# Patient Record
Sex: MALE | Race: WHITE | NOT HISPANIC OR LATINO | Employment: FULL TIME | URBAN - METROPOLITAN AREA
[De-identification: names, ages, dates, MRNs, and addresses within clinical notes are randomized per-mention and may not be internally consistent; named-entity substitution may affect disease eponyms.]

---

## 2020-11-11 ENCOUNTER — OFFICE VISIT (OUTPATIENT)
Dept: INTERNAL MEDICINE | Facility: CLINIC | Age: 50
End: 2020-11-11
Payer: COMMERCIAL

## 2020-11-11 VITALS
HEART RATE: 65 BPM | DIASTOLIC BLOOD PRESSURE: 80 MMHG | WEIGHT: 166.1 LBS | BODY MASS INDEX: 25.17 KG/M2 | RESPIRATION RATE: 16 BRPM | SYSTOLIC BLOOD PRESSURE: 124 MMHG | TEMPERATURE: 98.8 F | HEIGHT: 68 IN | OXYGEN SATURATION: 97 %

## 2020-11-11 DIAGNOSIS — Z12.11 ENCOUNTER FOR SCREENING FOR MALIGNANT NEOPLASM OF COLON: ICD-10-CM

## 2020-11-11 DIAGNOSIS — Z00.00 ENCOUNTER FOR ROUTINE ADULT HEALTH EXAMINATION WITHOUT ABNORMAL FINDINGS: Primary | ICD-10-CM

## 2020-11-11 DIAGNOSIS — Z13.220 ENCOUNTER FOR SCREENING FOR LIPID DISORDER: ICD-10-CM

## 2020-11-11 DIAGNOSIS — Z13.1 ENCOUNTER FOR SCREENING FOR DIABETES MELLITUS: ICD-10-CM

## 2020-11-11 PROCEDURE — 99386 PREV VISIT NEW AGE 40-64: CPT | Performed by: INTERNAL MEDICINE

## 2020-11-11 ASSESSMENT — MIFFLIN-ST. JEOR: SCORE: 1579.98

## 2020-11-11 NOTE — PATIENT INSTRUCTIONS
- I will send you a message on [employer] when I am able to look at the results of your tests from today.    Patient Education   Colonoscopy  What You Should Know  Please arrive with an adult who can drive you home after the exam and stay with you for the next six hours. The medicines used in the exam will make you sleepy. You will not be able to drive. You cannot take a bus or taxi by yourself.  What is a colonoscopy?  A colonoscopy lets the doctor look inside your large intestine (colon). The doctor guides a scope, or small camera, through the entire colon. The scope is attached to a long, flexible tube. The image from the scope appears on a large TV screen.   The scope will send air into your colon. This opens the colon so the doctor can see it better on the screen.  The exam looks for defects such as inflamed tissue, polyps, ulcers (sores), diverticula (pouches in the wall of the colon) and signs of cancer.  How do I prepare for the exam?  Read your guidelines for cleansing the colon. It is highly important that you follow the directions closely. If your colon is empty and clean, your exam will be more thorough and take less time.   Be sure to tell your doctor or nurse if you have ever had eye, lung or heart disease (including endocarditis or an artificial valve); diabetes; hepatitis; or any allergies to medicines.  Plan to arrive on time for your exam.    Dress in comfortable, loose clothing.    Bring your insurance card. Leave your purse, billfold, credit cards and other valuables at home.    Bring a list of your medicines and known allergies. If you have a pacemaker or ICD, please bring your information card.    We do our best to stay on time, but there may be a delay. Please bring something to pass the time, such as a newspaper or book.  What happens when I arrive?    You will fill out some paperwork, then we will take you to a private area. A nurse will check your records and ask you questions.    You will  change into a hospital gown. We may ask you to remove any jewelry.    We will review the risks and benefits of the exam. You will need to sign a consent form.    We will insert an IV (thin tube) into a vein in your hand or arm. We will use this to give you medicine.  What happens during the exam?  The exam takes from 15 minutes to one hour. You may ask questions of the doctor.    You will lie on your left side on a table.    You will receive medicines to relieve pain and help you relax.    The doctor will insert the scope into your rectum (bottom). The scope is on a long, thin tube. The doctor will slowly guide the scope into your colon. The tube bends, so it can move through the curves of your colon.    We may ask you to change positions to help the doctor move the scope.  If we see any polyps (growths), we will remove them. We do this because polyps can cause bleeding or turn into cancer. For some polyps, we will take tissue (a biopsy) to test in the lab. Most polyps turn out to be benign (not cancer).   Will it hurt?  You should feel little or no discomfort. The air will make you will feel full, and you will notice some pressure as the tube passes through your colon. Tell your doctor or nurse if you feel any pain. If you have cramps, breathe slowly to help your body relax.   What happens after the exam?    We will take you to the recovery area. We will watch you for up to one hour or until most of the medicine has worn off.    We will remove the IV. You will receive a report about your exam.    You may feel bloated or crampy for a short time because of the air that was injected. You will need to be passing air before you go home.    Your first meal should be light. Slowly return to normal meals, unless your doctor gives you other orders. Take it easy the rest of the day.    If you had a polyp removed:  ? Avoid heavy exercise for one week (no heavy lifting, sports or other activity).  ? Avoid nuts and popcorn for  one week.  ? You may have bleeding for several days after your exam. Call your doctor if bleeding is heavy or you have black or bloody stools (bowel movements).  ? If you normally take aspirin or blood thinners, ask your doctor when you can start taking them again.    You may receive more than one bill for your exam. (Separate charges may come from the clinic, doctor, lab, etc.).  What if I need to change my appointment?  If you cannot keep your appointment, please call us as soon as you can. Our center is very busy, and we will need to contact the patient who comes after you.   For informational purposes only. Not to replace the advice of your health care provider. Copyright   2007 [employer]. All rights reserved. Developed in collaboration with [facility]. Clinically reviewed by Gastroenterology Steering Committee. [employer] [id number] - REV 06/19

## 2020-11-11 NOTE — PROGRESS NOTES
SUBJECTIVE:   CC: Edgar Lu is an 50 year old male who presents for preventative health visit.     {Patient has been advised of split billing requirements and indicates understanding: Yes     Healthy Habits:     Getting at least 3 servings of Calcium per day:  Yes    Bi-annual eye exam:  Yes    Dental care twice a year:  NO    Sleep apnea or symptoms of sleep apnea:  None    Diet:  Regular (no restrictions)    Frequency of exercise:  2-3 days/week    Duration of exercise:  15-30 minutes    Taking medications regularly:  Not Applicable    Medication side effects:  None    PHQ-2 Total Score: 0    Additional concerns today:  No    Edgar comes in today for a physical exam. He has no complaints today. He does not desire to discuss anything else.    Of note, we had a lengthy discussion today about the pluses and minuses of PSA testing and he ultimately declined to pursue it.    Today's PHQ-2 Score:   PHQ-2 ( 1999 Pfizer) 11/11/2020   Q1: Little interest or pleasure in doing things 0   Q2: Feeling down, depressed or hopeless 0   PHQ-2 Score 0   Q1: Little interest or pleasure in doing things Not at all   Q2: Feeling down, depressed or hopeless Not at all   PHQ-2 Score 0       Abuse: Current or Past(Physical, Sexual or Emotional)- No  Do you feel safe in your environment? Yes    Social History     Tobacco Use     Smoking status: Never Smoker     Smokeless tobacco: Never Used   Substance Use Topics     Alcohol use: No     If you drink alcohol do you typically have >3 drinks per day or >7 drinks per week? No    Alcohol Use 11/11/2020   Prescreen: >3 drinks/day or >7 drinks/week? Not Applicable   Prescreen: >3 drinks/day or >7 drinks/week? -       Last PSA: No results found for: PSA    Reviewed orders with patient. Reviewed health maintenance and updated orders accordingly - Yes  Labs reviewed in EPIC    Reviewed and updated as needed this visit by clinical staff  Tobacco  Allergies  Meds  Problems  Med Hx  Surg Hx  " Fam Hx  Soc Hx          Reviewed and updated as needed this visit by Provider  Tobacco  Allergies  Meds  Problems  Med Hx  Surg Hx  Fam Hx          Review of Systems  CONSTITUTIONAL: NEGATIVE for fever, chills, change in weight  INTEGUMENTARY/SKIN: NEGATIVE for worrisome rashes, moles or lesions  EYES: NEGATIVE for vision changes or irritation  ENT: NEGATIVE for ear, mouth and throat problems  RESP: NEGATIVE for significant cough or SOB  CV: NEGATIVE for chest pain, palpitations or peripheral edema  GI: NEGATIVE for nausea, abdominal pain, heartburn, or change in bowel habits   male: negative for dysuria, hematuria, decreased urinary stream, erectile dysfunction, urethral discharge  MUSCULOSKELETAL: NEGATIVE for significant arthralgias or myalgia  NEURO: NEGATIVE for weakness, dizziness or paresthesias  PSYCHIATRIC: NEGATIVE for changes in mood or affect    OBJECTIVE:   /80   Pulse 65   Temp 98.8  F (37.1  C) (Temporal)   Resp 16   Ht 1.715 m (5' 7.5\")   Wt 75.3 kg (166 lb 1.6 oz)   SpO2 97%   BMI 25.63 kg/m      Physical Exam  GENERAL alert and in no distress.  EYES conjunctivae/corneas clear. PERRL. EOMs grossly intact  HENT: NC/AT, facies symmetric  NECK: Neck supple. No LAD, without thyroidmegaly or JVD.  LYMPH: no cervical LAD appreciated  RESP: CTAB. No w/r/r.  CV: RRR, no m/r/g.  GI: NT, ND, no organomegaly, normal BS in all quadrants, without rebound or guarding  MSK: No cyanosis, clubbing or edema noted bilaterally in upper and/or lower extremities  SKIN: no significant ulcers, lesions or rashes on the visualized portions of the skin  NEURO: Alert. Oriented. Gait normal. Sensation grossly WNL.  PSYCH: Linear thought process. Speech normal rate and volume; able to articulate logical thoughts, able to abstract reason. No tangential thoughts, hallucinations, or delusions.    Diagnostic Test Results: Labs reviewed in [facility]    ASSESSMENT/PLAN:   1. Encounter for routine adult health " "examination without abnormal findings  We discussed cardiac risk factor modification, including screening for (and treating if present) high cholesterol, HTN, DM, and avoiding smoking.   Also discussed age appropriate cancer screening recommendations, including prostate, colon and lung cancer as dictated by age group.  Recommended a healthy, plant-focused diet as well as regular aerobic activity.  Recommended moderation in any alcohol use.     2. Encounter for screening for lipid disorder  Not fasting today. Will set up visit for fasting blood draw.  - Lipid Profile; Future    3. Encounter for screening for diabetes mellitus  Not fasting today. Will set up visit for fasting blood draw.  - Basic metabolic panel; Future    4. Encounter for screening for malignant neoplasm of colon  Discussed different CRCa screening techniques, and he was interested in a colonoscopy. Referral sent.  - GASTROENTEROLOGY ADULT REF PROCEDURE ONLY    Patient has been advised of split billing requirements and indicates understanding: Yes  COUNSELING:   Reviewed preventive health counseling, as reflected in patient instructions       Regular exercise       Healthy diet/nutrition       Colon cancer screening       Prostate cancer screening    Estimated body mass index is 25.63 kg/m  as calculated from the following:    Height as of this encounter: 1.715 m (5' 7.5\").    Weight as of this encounter: 75.3 kg (166 lb 1.6 oz).     Weight management plan: Discussed healthy diet and exercise guidelines    He reports that he has never smoked. He has never used smokeless tobacco.    Counseling Resources:  ATP IV Guidelines  Pooled Cohorts Equation Calculator  FRAX Risk Assessment  ICSI Preventive Guidelines  Dietary Guidelines for Americans, 2010  USDA's MyPlate  ASA Prophylaxis  Lung CA Screening    Dillon Phillips MD  [facility]  "

## 2020-11-21 DIAGNOSIS — Z13.1 ENCOUNTER FOR SCREENING FOR DIABETES MELLITUS: ICD-10-CM

## 2020-11-21 DIAGNOSIS — Z13.220 ENCOUNTER FOR SCREENING FOR LIPID DISORDER: ICD-10-CM

## 2020-11-21 LAB
ANION GAP SERPL CALCULATED.3IONS-SCNC: 4 MMOL/L (ref 3–14)
BUN SERPL-MCNC: 17 MG/DL (ref 7–30)
CALCIUM SERPL-MCNC: 9.2 MG/DL (ref 8.5–10.1)
CHLORIDE SERPL-SCNC: 110 MMOL/L (ref 94–109)
CHOLEST SERPL-MCNC: 193 MG/DL
CO2 SERPL-SCNC: 28 MMOL/L (ref 20–32)
CREAT SERPL-MCNC: 1.04 MG/DL (ref 0.66–1.25)
GFR SERPL CREATININE-BSD FRML MDRD: 83 ML/MIN/{1.73_M2}
GLUCOSE SERPL-MCNC: 91 MG/DL (ref 70–99)
HDLC SERPL-MCNC: 43 MG/DL
LDLC SERPL CALC-MCNC: 133 MG/DL
NONHDLC SERPL-MCNC: 150 MG/DL
POTASSIUM SERPL-SCNC: 3.9 MMOL/L (ref 3.4–5.3)
SODIUM SERPL-SCNC: 142 MMOL/L (ref 133–144)
TRIGL SERPL-MCNC: 85 MG/DL

## 2020-11-21 PROCEDURE — 80048 BASIC METABOLIC PNL TOTAL CA: CPT | Performed by: INTERNAL MEDICINE

## 2020-11-21 PROCEDURE — 80061 LIPID PANEL: CPT | Performed by: INTERNAL MEDICINE

## 2020-11-21 PROCEDURE — 36415 COLL VENOUS BLD VENIPUNCTURE: CPT | Performed by: INTERNAL MEDICINE

## 2020-11-23 ENCOUNTER — TELEPHONE (OUTPATIENT)
Dept: INTERNAL MEDICINE | Facility: CLINIC | Age: 50
End: 2020-11-23

## 2020-11-23 NOTE — TELEPHONE ENCOUNTER
I re-sent an email with his [place]alo code. Please ensure he is able to access T[place]alo or is able to access my note + his labs.

## 2020-11-23 NOTE — TELEPHONE ENCOUNTER
Reason for call:  Other   Patient called regarding (reason for call): Documentation of visit with physical and labs on it.   Additional comments: Patient tried setting up Mychart but it didn't work. They need a document indicating that they visited the doctor with physical and labs done. If you can contact him back on this. The aftervisit summary he got doesn't have the labs noted.     Phone number to reach patient:  Cell number on file:    Telephone Information:   Mobile [phone]       Best Time:  After 3pm     Can we leave a detailed message on this number?  YES    Travel screening: Not Applicable

## 2020-12-01 DIAGNOSIS — Z11.59 ENCOUNTER FOR SCREENING FOR OTHER VIRAL DISEASES: Primary | ICD-10-CM

## 2020-12-13 DIAGNOSIS — Z11.59 ENCOUNTER FOR SCREENING FOR OTHER VIRAL DISEASES: ICD-10-CM

## 2020-12-13 LAB
SARS-COV-2 RNA SPEC QL NAA+PROBE: NORMAL
SPECIMEN SOURCE: NORMAL

## 2020-12-13 PROCEDURE — U0003 INFECTIOUS AGENT DETECTION BY NUCLEIC ACID (DNA OR RNA); SEVERE ACUTE RESPIRATORY SYNDROME CORONAVIRUS 2 (SARS-COV-2) (CORONAVIRUS DISEASE [COVID-19]), AMPLIFIED PROBE TECHNIQUE, MAKING USE OF HIGH THROUGHPUT TECHNOLOGIES AS DESCRIBED BY CMS-2020-01-R: HCPCS | Performed by: INTERNAL MEDICINE

## 2020-12-14 LAB
LABORATORY COMMENT REPORT: NORMAL
SARS-COV-2 RNA SPEC QL NAA+PROBE: NEGATIVE
SPECIMEN SOURCE: NORMAL

## 2020-12-16 ENCOUNTER — HOSPITAL ENCOUNTER (OUTPATIENT)
Facility: CLINIC | Age: 50
Discharge: HOME | End: 2020-12-16
Attending: INTERNAL MEDICINE | Admitting: INTERNAL MEDICINE
Payer: COMMERCIAL

## 2020-12-16 VITALS
OXYGEN SATURATION: 97 % | DIASTOLIC BLOOD PRESSURE: 65 MMHG | BODY MASS INDEX: 25.16 KG/M2 | TEMPERATURE: 97.7 F | SYSTOLIC BLOOD PRESSURE: 104 MMHG | RESPIRATION RATE: 14 BRPM | WEIGHT: 166 LBS | HEIGHT: 68 IN | HEART RATE: 53 BPM

## 2020-12-16 LAB — COLONOSCOPY: NORMAL

## 2020-12-16 PROCEDURE — 258N000003 HC RX IP 258 OP 636: Performed by: INTERNAL MEDICINE

## 2020-12-16 PROCEDURE — G0500 MOD SEDAT ENDO SERVICE >5YRS: HCPCS | Performed by: INTERNAL MEDICINE

## 2020-12-16 PROCEDURE — 45378 DIAGNOSTIC COLONOSCOPY: CPT | Performed by: INTERNAL MEDICINE

## 2020-12-16 PROCEDURE — G0121 COLON CA SCRN NOT HI RSK IND: HCPCS | Performed by: INTERNAL MEDICINE

## 2020-12-16 PROCEDURE — 250N000011 HC RX IP 250 OP 636: Performed by: INTERNAL MEDICINE

## 2020-12-16 RX ORDER — FENTANYL CITRATE 50 UG/ML
INJECTION, SOLUTION INTRAMUSCULAR; INTRAVENOUS PRN
Status: DISCONTINUED | OUTPATIENT
Start: 2020-12-16 | End: 2020-12-16 | Stop reason: HOSPADM

## 2020-12-16 ASSESSMENT — MIFFLIN-ST. JEOR: SCORE: 1579.53

## 2021-10-03 ENCOUNTER — HEALTH MAINTENANCE LETTER (OUTPATIENT)
Age: 51
End: 2021-10-03

## 2021-11-21 ASSESSMENT — ENCOUNTER SYMPTOMS
CHILLS: 0
FEVER: 0
HEMATOCHEZIA: 0
SORE THROAT: 0
HEARTBURN: 0
PARESTHESIAS: 0
CONSTIPATION: 0
JOINT SWELLING: 0
DYSURIA: 0
DIARRHEA: 0
ARTHRALGIAS: 0
HEMATURIA: 0
EYE PAIN: 0
WEAKNESS: 0
DIZZINESS: 0
NAUSEA: 0
NERVOUS/ANXIOUS: 0
PALPITATIONS: 0
COUGH: 0
SHORTNESS OF BREATH: 0
MYALGIAS: 0
HEADACHES: 0
FREQUENCY: 0
ABDOMINAL PAIN: 0

## 2021-11-24 ENCOUNTER — OFFICE VISIT (OUTPATIENT)
Dept: INTERNAL MEDICINE | Facility: CLINIC | Age: 51
End: 2021-11-24
Payer: COMMERCIAL

## 2021-11-24 VITALS
DIASTOLIC BLOOD PRESSURE: 72 MMHG | OXYGEN SATURATION: 98 % | BODY MASS INDEX: 26.24 KG/M2 | TEMPERATURE: 98.6 F | HEIGHT: 68 IN | SYSTOLIC BLOOD PRESSURE: 100 MMHG | HEART RATE: 77 BPM | WEIGHT: 173.1 LBS

## 2021-11-24 DIAGNOSIS — Z13.1 ENCOUNTER FOR SCREENING FOR DIABETES MELLITUS: ICD-10-CM

## 2021-11-24 DIAGNOSIS — E78.5 HYPERLIPIDEMIA LDL GOAL <130: ICD-10-CM

## 2021-11-24 DIAGNOSIS — Z00.00 ROUTINE GENERAL MEDICAL EXAMINATION AT A HEALTH CARE FACILITY: Primary | ICD-10-CM

## 2021-11-24 DIAGNOSIS — A69.20 LYME DISEASE: ICD-10-CM

## 2021-11-24 DIAGNOSIS — Z71.85 VACCINE COUNSELING: ICD-10-CM

## 2021-11-24 LAB
ALBUMIN SERPL-MCNC: 3.6 G/DL (ref 3.4–5)
ALP SERPL-CCNC: 56 U/L (ref 40–150)
ALT SERPL W P-5'-P-CCNC: 38 U/L (ref 0–70)
ANION GAP SERPL CALCULATED.3IONS-SCNC: 3 MMOL/L (ref 3–14)
AST SERPL W P-5'-P-CCNC: 25 U/L (ref 0–45)
BILIRUB SERPL-MCNC: 0.6 MG/DL (ref 0.2–1.3)
BUN SERPL-MCNC: 15 MG/DL (ref 7–30)
CALCIUM SERPL-MCNC: 8.9 MG/DL (ref 8.5–10.1)
CHLORIDE BLD-SCNC: 107 MMOL/L (ref 94–109)
CHOLEST SERPL-MCNC: 185 MG/DL
CO2 SERPL-SCNC: 30 MMOL/L (ref 20–32)
CREAT SERPL-MCNC: 0.99 MG/DL (ref 0.66–1.25)
FASTING STATUS PATIENT QL REPORTED: YES
GFR SERPL CREATININE-BSD FRML MDRD: 88 ML/MIN/1.73M2
GLUCOSE BLD-MCNC: 83 MG/DL (ref 70–99)
HDLC SERPL-MCNC: 37 MG/DL
LDLC SERPL CALC-MCNC: 121 MG/DL
NONHDLC SERPL-MCNC: 148 MG/DL
POTASSIUM BLD-SCNC: 3.7 MMOL/L (ref 3.4–5.3)
PROT SERPL-MCNC: 6.9 G/DL (ref 6.8–8.8)
SODIUM SERPL-SCNC: 140 MMOL/L (ref 133–144)
TRIGL SERPL-MCNC: 136 MG/DL

## 2021-11-24 PROCEDURE — 80053 COMPREHEN METABOLIC PANEL: CPT | Performed by: INTERNAL MEDICINE

## 2021-11-24 PROCEDURE — 99213 OFFICE O/P EST LOW 20 MIN: CPT | Mod: 25 | Performed by: INTERNAL MEDICINE

## 2021-11-24 PROCEDURE — 99396 PREV VISIT EST AGE 40-64: CPT | Performed by: INTERNAL MEDICINE

## 2021-11-24 PROCEDURE — 36415 COLL VENOUS BLD VENIPUNCTURE: CPT | Performed by: INTERNAL MEDICINE

## 2021-11-24 PROCEDURE — 80061 LIPID PANEL: CPT | Performed by: INTERNAL MEDICINE

## 2021-11-24 RX ORDER — DOXYCYCLINE 100 MG/1
100 TABLET ORAL 2 TIMES DAILY
Qty: 28 TABLET | Refills: 0 | Status: SHIPPED | OUTPATIENT
Start: 2021-11-24 | End: 2021-12-08

## 2021-11-24 ASSESSMENT — ENCOUNTER SYMPTOMS
HEADACHES: 0
HEARTBURN: 0
FREQUENCY: 0
ABDOMINAL PAIN: 0
CONSTIPATION: 0
SHORTNESS OF BREATH: 0
DIARRHEA: 0
COUGH: 0
EYE PAIN: 0
FEVER: 0
WEAKNESS: 0
PARESTHESIAS: 0
NERVOUS/ANXIOUS: 0
DIZZINESS: 0
CHILLS: 0
HEMATURIA: 0
MYALGIAS: 0
JOINT SWELLING: 0
HEMATOCHEZIA: 0
DYSURIA: 0
ARTHRALGIAS: 0
PALPITATIONS: 0
SORE THROAT: 0
NAUSEA: 0

## 2021-11-24 ASSESSMENT — MIFFLIN-ST. JEOR: SCORE: 1614.68

## 2021-11-24 NOTE — PROGRESS NOTES
SUBJECTIVE:   CC: Edgar Lu is an 51 year old male who presents for preventative health visit.     Patient has been advised of split billing requirements and indicates understanding: Yes     Healthy Habits:    Getting at least 3 servings of Calcium per day:  Yes    Bi-annual eye exam:  Yes    Dental care twice a year:  NO    Sleep apnea or symptoms of sleep apnea:  None    Diet:  Regular (no restrictions)    Frequency of exercise:  2-3 days/week    Duration of exercise:  15-30 minutes    Taking medications regularly:  Yes    Medication side effects:  Not applicable    PHQ-2 Total Score:    Additional concerns today:  No    Dakota presents today for a physical exam. He is fasting today.  He tells me a number of months ago he had a tick bite in the center of his chest that then formed a circular red lesion with a dot in the middle.  He denies any neurologic changes, muscle aches, chest pain, or any other symptoms.  The rash went away and then came back a few months later and then went away again.  He does not have a rash currently.  He is wondering if he had shingles.    Today's PHQ-2 Score:   PHQ-2 ( 1999 Pfizer) 11/24/2021   Q1: Little interest or pleasure in doing things 0   Q2: Feeling down, depressed or hopeless 0   PHQ-2 Score 0   PHQ-2 Total Score (12-17 Years)- Positive if 3 or more points; Administer PHQ-A if positive -   Q1: Little interest or pleasure in doing things -   Q2: Feeling down, depressed or hopeless -   PHQ-2 Score -     Abuse: Current or Past (Physical, Sexual or Emotional)- No  Do you feel safe in your environment? Yes    Have you ever done Advance Care Planning? (For example, a Health Directive, POLST, or a discussion with a medical provider or your loved ones about your wishes): No    Social History     Tobacco Use     Smoking status: Never Smoker     Smokeless tobacco: Never Used   Substance Use Topics     Alcohol use: No     Alcohol Use 11/21/2021   Prescreen: >3 drinks/day or >7  "drinks/week? Not Applicable   Prescreen: >3 drinks/day or >7 drinks/week? -     Reviewed orders with patient. Reviewed health maintenance and updated orders accordingly - Yes    Labs reviewed in EPIC    Reviewed and updated as needed this visit by clinical staff  Tobacco  Allergies  Meds  Problems  Med Hx  Surg Hx  Fam Hx       Reviewed and updated as needed this visit by Provider  Tobacco  Allergies  Meds  Problems  Med Hx  Surg Hx  Fam Hx       Review of Systems   Constitutional: Negative for chills and fever.   HENT: Negative for congestion, ear pain, hearing loss and sore throat.    Eyes: Negative for pain and visual disturbance.   Respiratory: Negative for cough and shortness of breath.    Cardiovascular: Negative for chest pain, palpitations and peripheral edema.   Gastrointestinal: Negative for abdominal pain, constipation, diarrhea, heartburn, hematochezia and nausea.   Genitourinary: Negative for dysuria, frequency, genital sores, hematuria, impotence, penile discharge and urgency.   Musculoskeletal: Negative for arthralgias, joint swelling and myalgias.   Skin: Negative for rash.   Neurological: Negative for dizziness, weakness, headaches and paresthesias.   Psychiatric/Behavioral: Negative for mood changes. The patient is not nervous/anxious.        OBJECTIVE:   /72   Pulse 77   Temp 98.6  F (37  C) (Temporal)   Ht 1.727 m (5' 8\")   Wt 78.5 kg (173 lb 1.6 oz)   SpO2 98%   BMI 26.32 kg/m      Physical Exam  GENERAL: In no distress.  EYES: Conjunctivae/corneas clear. EOMs grossly intact.  HENT: NC/AT, facies symmetric. Neck supple. No LAD or thyromegaly noted.  RESP: CTAB. No w/r/r.  CV: RRR, no m/r/g.  GI: NT, ND, without rebound or guarding, no CVA tenderness, no hepatomegaly appreciated.  MSK: Moves all four extremities freely.  SKIN: No significant ulcers, lesions or rashes on the visualized portions of the skin  NEURO: Alert. Oriented.  PSYCH: Linear thought process. Speech " "normal rate and volume. No tangential thoughts, hallucinations, or delusions.    Diagnostic Test Results: Labs reviewed in Epic    ASSESSMENT/PLAN:   Routine general medical examination at a health care facility  Reviewed PMH. Discussed healthcare maintenance issues, including cancer screenings (UTD), relevant immunizations (he declined all vaccines today), and cardiac risk factor screenings such as for cholesterol, HTN, and DM. Letter given to him today per his request stating that we did a physical today and marco fasting lab work.    Lyme disease  He describes a fairly classic history of Lyme disease. Thankfully it does not sound like he has any sequelae of this condition. I still favor treating him with a 14 day course of doxycycline and he was in agreement. Side effects reviewed.  - doxycycline monohydrate (ADOXA) 100 MG tablet; Take 1 tablet (100 mg) by mouth 2 times daily for 14 days    Hyperlipidemia LDL goal <130  Fasting today.  - Lipid Profile; Future    Encounter for screening for diabetes mellitus  Fasting today.  - Comprehensive metabolic panel; Future    Vaccine counseling  He declined all vaccinations today and did not have any questions regarding why we recommend the vaccinations he is due for (though was given an opportunity to discuss any concerns he may have).    Patient has been advised of split billing requirements and indicates understanding: Yes     COUNSELING:   Special attention given to:        Regular exercise       Healthy diet/nutrition       Colon cancer screening    Estimated body mass index is 26.32 kg/m  as calculated from the following:    Height as of this encounter: 1.727 m (5' 8\").    Weight as of this encounter: 78.5 kg (173 lb 1.6 oz).     He reports that he has never smoked. He has never used smokeless tobacco.    Dillon Phillips MD  [facility]"

## 2021-11-24 NOTE — LETTER
November 24, 2021      Edgar Lu  [address] [address]        To Whom It May Concern:    Edgar Lu was seen in our clinic in 11/24/2021 for a physical exam. We did draw fasting blood work at this visit, which checked glucose and cholesterol.           Sincerely,        Dillon Phillips MD, MPH  [facility]  Internal Medicine

## 2021-11-24 NOTE — PATIENT INSTRUCTIONS
- I will send you a message on [employer] when I am able to look at the results of your tests from today  - Make an appointment with our pharmacy downstairs or stop by your preferred pharmacy to discuss obtaining the Shingrix (shingles) vaccine series

## 2021-12-01 ENCOUNTER — MYC MEDICAL ADVICE (OUTPATIENT)
Dept: INTERNAL MEDICINE | Facility: CLINIC | Age: 51
End: 2021-12-01
Payer: COMMERCIAL

## 2021-12-08 NOTE — TELEPHONE ENCOUNTER
Form emailed to patient per his request to [email or web address].  Form sent to be scanned into chart.

## 2021-12-21 ENCOUNTER — E-VISIT (OUTPATIENT)
Dept: URGENT CARE | Facility: URGENT CARE | Age: 51
End: 2021-12-21
Payer: COMMERCIAL

## 2021-12-21 DIAGNOSIS — Z20.822 SUSPECTED COVID-19 VIRUS INFECTION: Primary | ICD-10-CM

## 2021-12-21 PROCEDURE — 99421 OL DIG E/M SVC 5-10 MIN: CPT | Performed by: PHYSICIAN ASSISTANT

## 2021-12-21 NOTE — PATIENT INSTRUCTIONS
Dear Edgar Lu,    Your symptoms show that you may have coronavirus (COVID-19). This illness can cause fever, cough and trouble breathing. Many people get a mild case and get better on their own. Some people can get very sick.    Will I be tested for COVID-19?  We would like to test you for Covid-19 virus. I have placed orders for this test.     To schedule: go to your [email or web address] home page and scroll down to the section that says  You have an appointment that needs to be scheduled  and click the large green button that says  Schedule Now  and follow the steps to find the next available openings.    If you are unable to complete these [email or web address] scheduling steps, please call [phone] to schedule your testing.     Return to work/school/ guidance:  Please let your workplace manager and staffing office know when your quarantine ends     We can t give you an exact date as it depends on the above. You can calculate this on your own or work with your manager/staffing office to calculate this. (For example if you were exposed on 10/4, you would have to quarantine for 14 full days. That would be through 10/18. You could return on 10/19.)      If you receive a positive COVID-19 test result, follow the guidance of the those who are giving you the results. Usually the return to work is 10 (or in some cases 20 days from symptom onset.) If you work at [employer], you must also be cleared by Employee Occupational Health and Safety to return to work.        If you receive a negative COVID-19 test result and did not have a high risk exposure to someone with a known positive COVID-19 test, you can return to work once you're free of fever for 24 hours without fever-reducing medication and your symptoms are improving or resolved.      If you receive a negative COVID-19 test and If you had a high risk exposure to someone who has tested positive for COVID-19 then you can return to work 14 days after your last contact  with the positive individual    Note: If you have ongoing exposure to the covid positive person, this quarantine period may be more than 14 days. (For example, if you are continued to be exposed to your child who tested positive and cannot isolate from them, then the quarantine of 7-14 days can't start until your child is no longer contagious. This is typically 10 days from onset of the child's symptoms. So the total duration may be 17-24 days in this case.)    Sign up for [employer].   We know it's scary to hear that you might have COVID-19. We want to track your symptoms to make sure you're okay over the next 2 weeks. Please look for an email from [employer]--this is a free, online program that we'll use to keep in touch. To sign up, follow the link in the email you will receive. Learn more at [email or web address]    How can I take care of myself?    Get lots of rest. Drink extra fluids (unless a doctor has told you not to)    Take Tylenol (acetaminophen) or ibuprofen for fever or pain. If you have liver or kidney problems, ask your family doctor if it's okay to take Tylenol o ibuprofen    If you have other health problems (like cancer, heart failure, an organ transplant or severe kidney disease): Call your specialty clinic if you don't feel better in the next 2 days.    Know when to call 911. Emergency warning signs include:  o Trouble breathing or shortness of breath  o Pain or pressure in the chest that doesn't go away  o Feeling confused like you haven't felt before, or not being able to wake up  o Bluish-colored lips or face    Where can I get more information?  [facility] - About COVID-19:   [email or web address]    CDC - What to Do If You're Sick:   www.cdc.gov/coronavirus/2019-ncov/about/steps-when-sick.html    December 21, 2021  RE:  Edgar Lu                                                                                                                  [address] [facility]  [address] [place] [id number]      To whom it may concern:    I evaluated Edgar Lu on December 21, 2021. Edgar Lu should be excused from work/school.     They should let their workplace manager and staffing office know when their quarantine ends.    We can not give an exact date as it depends on the information below. They can calculate this on their own or work with their manager/staffing office to calculate this. (For example if they were exposed on 10/04, they would have to quarantine for 14 full days. That would be through 10/18. They could return on 10/19.)    Quarantine Guidelines:      If patient receives a positive COVID-19 test result, they should follow the guidance of those who are giving the results. Usually the return to work is 10 (or in some cases 20 days from symptom onset.) If they work at [employer], they must be cleared by Employee Occupational Health and Safety to return to work.        If patient receives a negative COVID-19 test result and did not have a high risk exposure to someone with a known positive COVID-19 test, they can return to work once they're free of fever for 24 hours without fever-reducing medication and their symptoms are improving or resolved.      If patient receives a negative COVID-19 test and if they had a high risk exposure to someone who has tested positive for COVID-19 then they can return to work 14 days after their last contact with the positive individual    Note: If there is ongoing exposure to the covid positive person, this quarantine period may be longer than 14 days. (For example, if they are continually exposed to their child, who tested positive and cannot isolate from them, then the quarantine of 7-14 days can't start until their child is no longer contagious. This is typically 10 days from onset to the child's symptoms. So the total duration may be 17-24 days in this case.)     Sincerely,  Aditya Chacon PA-C

## 2021-12-22 ENCOUNTER — LAB (OUTPATIENT)
Dept: URGENT CARE | Facility: URGENT CARE | Age: 51
End: 2021-12-22
Attending: PHYSICIAN ASSISTANT
Payer: COMMERCIAL

## 2021-12-22 DIAGNOSIS — Z20.822 SUSPECTED COVID-19 VIRUS INFECTION: ICD-10-CM

## 2021-12-22 PROCEDURE — U0003 INFECTIOUS AGENT DETECTION BY NUCLEIC ACID (DNA OR RNA); SEVERE ACUTE RESPIRATORY SYNDROME CORONAVIRUS 2 (SARS-COV-2) (CORONAVIRUS DISEASE [COVID-19]), AMPLIFIED PROBE TECHNIQUE, MAKING USE OF HIGH THROUGHPUT TECHNOLOGIES AS DESCRIBED BY CMS-2020-01-R: HCPCS

## 2021-12-22 PROCEDURE — U0005 INFEC AGEN DETEC AMPLI PROBE: HCPCS

## 2021-12-23 LAB — SARS-COV-2 RNA RESP QL NAA+PROBE: NEGATIVE

## 2022-05-17 ENCOUNTER — OFFICE VISIT (OUTPATIENT)
Dept: FAMILY MEDICINE | Facility: CLINIC | Age: 52
End: 2022-05-17

## 2022-05-17 ENCOUNTER — ANCILLARY PROCEDURE (OUTPATIENT)
Dept: GENERAL RADIOLOGY | Facility: CLINIC | Age: 52
End: 2022-05-17
Attending: PHYSICIAN ASSISTANT
Payer: COMMERCIAL

## 2022-05-17 VITALS
HEART RATE: 60 BPM | TEMPERATURE: 97.8 F | WEIGHT: 168 LBS | BODY MASS INDEX: 25.54 KG/M2 | OXYGEN SATURATION: 97 % | SYSTOLIC BLOOD PRESSURE: 122 MMHG | RESPIRATION RATE: 16 BRPM | DIASTOLIC BLOOD PRESSURE: 77 MMHG

## 2022-05-17 DIAGNOSIS — S52.135A CLOSED NONDISPLACED FRACTURE OF NECK OF LEFT RADIUS, INITIAL ENCOUNTER: ICD-10-CM

## 2022-05-17 DIAGNOSIS — S59.912A FOREARM INJURY, LEFT, INITIAL ENCOUNTER: Primary | ICD-10-CM

## 2022-05-17 PROCEDURE — 73090 X-RAY EXAM OF FOREARM: CPT | Mod: TC | Performed by: RADIOLOGY

## 2022-05-17 PROCEDURE — 99214 OFFICE O/P EST MOD 30 MIN: CPT | Performed by: PHYSICIAN ASSISTANT

## 2022-05-17 PROCEDURE — A4565 SLINGS: HCPCS | Performed by: PHYSICIAN ASSISTANT

## 2022-05-17 NOTE — PROGRESS NOTES
Assessment & Plan:      Problem List Items Addressed This Visit    None     Visit Diagnoses     Forearm injury, left, initial encounter    -  Primary    Relevant Orders    XR Forearm Left 2 Views (Completed)    Closed nondisplaced fracture of neck of left radius, initial encounter        Relevant Orders    ARM SLING, M (Completed)        Medical Decision Making  Patient presents with acute injury to the left forearm 1 week ago.  X-ray today shows a nondisplaced radial neck fracture.  Patient maintains full range of motion to the left elbow.  Supplied patient with an arm sling to help with discomfort, but recommended regular and routine stretching to the left elbow.  Further recommended cold compresses and over-the-counter analgesics as needed.  Avoid aggravating activities.  Follow-up with PCP in 1 week for symptom recheck as needed.     Subjective:      Edgar Lu is a 51 year old male here for evaluation of left forearm pain.  Event of injury occurred 1 week ago.  Patient was walking his dog on a trail up some wooden steps when he lost his balance.  He fell to his side and struck the left forearm on the edge of a wooden stair.  Patient developed instant pain at that time and some swelling.  He denies bruising.  The swelling and pain seem to improve over the next 4 to 5 days.  Pain worsened again when patient helped his parents move some furniture.     The following portions of the patient's history were reviewed and updated as appropriate: allergies, current medications, and problem list.     Review of Systems  Pertinent items are noted in HPI.    Allergies  No Known Allergies    Family History   Problem Relation Age of Onset     Allergies Mother      Asthma Mother      Lipids Mother         dyslipidemia     Other - See Comments Mother         GERD     Hypertension Mother      Asthma Other         family h/o     Asthma Father        Social History     Tobacco Use     Smoking status: Never Smoker      Smokeless tobacco: Never Used   Substance Use Topics     Alcohol use: No        Objective:      /77 (BP Location: Right arm, Patient Position: Sitting, Cuff Size: Adult Large)   Pulse 60   Temp 97.8  F (36.6  C) (Oral)   Resp 16   Wt 76.2 kg (168 lb)   SpO2 97%   BMI 25.54 kg/m    General appearance - alert, well appearing, and in no distress and non-toxic  Extremities - Left forearm: Tenderness to palpation across the middle forearm throughout; otherwise full range of motion of the left elbow and left wrist without difficulty, no scaphoid tenderness or wrist tenderness; no obvious signs of trauma or deformity  Skin - normal coloration and turgor, no rashes, no suspicious skin lesions noted     Lab & Imaging Results    Results for orders placed or performed in visit on 05/17/22 (from the past 24 hour(s))   XR Forearm Left 2 Views    Narrative    EXAM: XR FOREARM LEFT 2 VIEWS  LOCATION: [facility]  DATE/TIME: 5/17/2022 3:26 PM    INDICATION: left forearm trauma 1 week ago ongoing pain; tenderness over the middle forearm throughout; rule out acute fracture  COMPARISON: None.      Impression    IMPRESSION: Acute to subacute nondisplaced fracture of the radial neck subtle offset and curvilinear sclerosis. Probable joint effusion.           I personally reviewed these results and discussed findings with the patient.    The use of Dragon/MugenUp dictation services was used to construct the content of this note; any grammatical errors are non-intentional. Please contact the author directly if you are in need of any clarification.

## 2022-09-04 ENCOUNTER — HEALTH MAINTENANCE LETTER (OUTPATIENT)
Age: 52
End: 2022-09-04

## 2022-11-30 ENCOUNTER — OFFICE VISIT (OUTPATIENT)
Dept: INTERNAL MEDICINE | Facility: CLINIC | Age: 52
End: 2022-11-30
Payer: COMMERCIAL

## 2022-11-30 VITALS
SYSTOLIC BLOOD PRESSURE: 102 MMHG | HEART RATE: 58 BPM | BODY MASS INDEX: 25.91 KG/M2 | OXYGEN SATURATION: 99 % | WEIGHT: 171 LBS | TEMPERATURE: 98 F | DIASTOLIC BLOOD PRESSURE: 65 MMHG | RESPIRATION RATE: 18 BRPM | HEIGHT: 68 IN

## 2022-11-30 DIAGNOSIS — Z13.220 SCREENING FOR HYPERLIPIDEMIA: ICD-10-CM

## 2022-11-30 DIAGNOSIS — Z13.1 SCREENING FOR DIABETES MELLITUS: ICD-10-CM

## 2022-11-30 DIAGNOSIS — Z71.85 VACCINE COUNSELING: ICD-10-CM

## 2022-11-30 DIAGNOSIS — Z00.00 ROUTINE GENERAL MEDICAL EXAMINATION AT A HEALTH CARE FACILITY: Primary | ICD-10-CM

## 2022-11-30 LAB
ALBUMIN SERPL BCG-MCNC: 4.3 G/DL (ref 3.5–5.2)
ALP SERPL-CCNC: 60 U/L (ref 40–129)
ALT SERPL W P-5'-P-CCNC: 37 U/L (ref 10–50)
ANION GAP SERPL CALCULATED.3IONS-SCNC: 7 MMOL/L (ref 7–15)
AST SERPL W P-5'-P-CCNC: 42 U/L (ref 10–50)
BILIRUB SERPL-MCNC: 0.5 MG/DL
BUN SERPL-MCNC: 13.5 MG/DL (ref 6–20)
CALCIUM SERPL-MCNC: 9 MG/DL (ref 8.6–10)
CHLORIDE SERPL-SCNC: 108 MMOL/L (ref 98–107)
CHOLEST SERPL-MCNC: 201 MG/DL
CREAT SERPL-MCNC: 1.02 MG/DL (ref 0.67–1.17)
DEPRECATED HCO3 PLAS-SCNC: 28 MMOL/L (ref 22–29)
GFR SERPL CREATININE-BSD FRML MDRD: 88 ML/MIN/1.73M2
GLUCOSE SERPL-MCNC: 99 MG/DL (ref 70–99)
HBA1C MFR BLD: 5.3 % (ref 0–5.6)
HDLC SERPL-MCNC: 35 MG/DL
LDLC SERPL CALC-MCNC: 142 MG/DL
NONHDLC SERPL-MCNC: 166 MG/DL
POTASSIUM SERPL-SCNC: 4 MMOL/L (ref 3.4–5.3)
PROT SERPL-MCNC: 6.9 G/DL (ref 6.4–8.3)
SODIUM SERPL-SCNC: 143 MMOL/L (ref 136–145)
TRIGL SERPL-MCNC: 118 MG/DL

## 2022-11-30 PROCEDURE — 80053 COMPREHEN METABOLIC PANEL: CPT | Performed by: INTERNAL MEDICINE

## 2022-11-30 PROCEDURE — 83036 HEMOGLOBIN GLYCOSYLATED A1C: CPT | Performed by: INTERNAL MEDICINE

## 2022-11-30 PROCEDURE — 80061 LIPID PANEL: CPT | Performed by: INTERNAL MEDICINE

## 2022-11-30 PROCEDURE — 99396 PREV VISIT EST AGE 40-64: CPT | Performed by: INTERNAL MEDICINE

## 2022-11-30 PROCEDURE — 36415 COLL VENOUS BLD VENIPUNCTURE: CPT | Performed by: INTERNAL MEDICINE

## 2022-11-30 ASSESSMENT — ENCOUNTER SYMPTOMS
JOINT SWELLING: 0
PALPITATIONS: 0
ARTHRALGIAS: 0
HEMATOCHEZIA: 0
NERVOUS/ANXIOUS: 0
COUGH: 0
PARESTHESIAS: 0
NAUSEA: 0
HEMATURIA: 0
EYE PAIN: 0
WEAKNESS: 0
MYALGIAS: 0
SORE THROAT: 0
HEADACHES: 0
HEARTBURN: 0
CHILLS: 0
FREQUENCY: 0
FEVER: 0
DYSURIA: 0
CONSTIPATION: 0
DIARRHEA: 0
SHORTNESS OF BREATH: 0
ABDOMINAL PAIN: 0
DIZZINESS: 0

## 2022-11-30 ASSESSMENT — PAIN SCALES - GENERAL: PAINLEVEL: NO PAIN (0)

## 2022-11-30 NOTE — ASSESSMENT & PLAN NOTE
Max is doing well today.  We will update his screening/fasting blood work today.  Declined screening for HIV and hepatitis C.  Offered and discussed vaccination for influenza, COVID booster and shingles.  Patient was given the opportunity to ask questions or raise concerns regarding these recommended vaccine, he declined all vaccinations did not wish to discuss further.  We also discussed importance of diet and exercise, his BMI is very mildly elevated at 26.  He plans to work on these lifestyle changes to lose a few pounds.  Colonoscopy up-to-date, not due again until 2030.

## 2022-11-30 NOTE — LETTER
December 6, 2022      Dakota Lu  [address] [address]        Dear ,    We are writing to inform you of your test results.    Here are your recent results. Your A1c (screen for diabetes) was negative. Your kidney and liver function and electrolytes were normal. Your fasting blood sugar for your work paperwork was 99.       Your cholesterol was a bit higher this year than last year. However, we don't treat cholesterol just based on numbers, we decide to treat based on overall risk for heart attack and stroke, which having high cholesterol puts you at risk for. When I put your cholesterol numbers, age, gender and any known risk factors (whether you have diabetes, hypertension or area smoker) into a calculator which calculates your 10 year risk of heart attack or stroke, your risk is estimated to be 4.1%. We don't need to start treating cholesterol until that number is 5-10%. So, you are close to the level where we would need to talk about starting a medication but for now I recommend that you definitely work on diet and some weight loss along with increasing exercise as able. As you do these things, we would expect to see improvement in your cholesterol numbers. We will continue to check them once a year.       I recommend at least 30 minutes of moderate intensity aerobic activity 5 or more days per week. Regarding diet, I recommend cutting back on saturated and trans fats (also called hydrogenated) by selecting lean cuts of meat, low-fat dairy, and using oils instead of solid fats. Limit baked goods, processed meats, and fried foods. Try to eat about two, 3.5 ounce servings of non-fried fish such as salmon, herring, sardines or mackerel per week . Most fish contain omega-3 fatty acids which can help lower total blood cholesterol and triglyceride levels. Eating more whole grains and soluble fiber (such as oat bran) will also help lower your cholesterol levels.       Resulted Orders    Lipid Profile (Chol, Trig, HDL, LDL calc)   Result Value Ref Range    Cholesterol 201 (H) <200 mg/dL    Triglycerides 118 <150 mg/dL    Direct Measure HDL 35 (L) >=40 mg/dL    LDL Cholesterol Calculated 142 (H) <=100 mg/dL    Non HDL Cholesterol 166 (H) <130 mg/dL    Narrative    Cholesterol  Desirable:  <200 mg/dL    Triglycerides  Normal:  Less than 150 mg/dL  Borderline High:  150-199 mg/dL  High:  200-499 mg/dL  Very High:  Greater than or equal to 500 mg/dL    Direct Measure HDL  Female:  Greater than or equal to 50 mg/dL   Male:  Greater than or equal to 40 mg/dL    LDL Cholesterol  Desirable:  <100mg/dL  Above Desirable:  100-129 mg/dL   Borderline High:  130-159 mg/dL   High:  160-189 mg/dL   Very High:  >= 190 mg/dL    Non HDL Cholesterol  Desirable:  130 mg/dL  Above Desirable:  130-159 mg/dL  Borderline High:  160-189 mg/dL  High:  190-219 mg/dL  Very High:  Greater than or equal to 220 mg/dL   Hemoglobin A1c   Result Value Ref Range    Hemoglobin A1C 5.3 0.0 - 5.6 %      Comment:      Normal <5.7%   Prediabetes 5.7-6.4%    Diabetes 6.5% or higher     Note: Adopted from ADA consensus guidelines.       If you have any questions or concerns, please call the clinic at the number listed above.       Sincerely,      Kimberlyn Espinoza MD

## 2022-11-30 NOTE — PROGRESS NOTES
SUBJECTIVE:   CC: Dakota is an 52 year old who presents for preventative health visit.     He has forms to fill out for his work with fasting lab values. Only concern today is a spot on his chest that he noticed over the summer. Has not changed in size since then. Is not painful or pruritic.     Patient has been advised of split billing requirements and indicates understanding: Yes  Healthy Habits:     Getting at least 3 servings of Calcium per day:  Yes    Bi-annual eye exam:  Yes    Dental care twice a year:  Yes    Sleep apnea or symptoms of sleep apnea:  None and Daytime drowsiness    Diet:  Regular (no restrictions)    Frequency of exercise:  2-3 days/week    Duration of exercise:  15-30 minutes    Taking medications regularly:  Yes    Medication side effects:  None    PHQ-2 Total Score: 0    Additional concerns today:  No    Today's PHQ-2 Score:   PHQ-2 ( 1999 Pfizer) 11/30/2022   Q1: Little interest or pleasure in doing things 0   Q2: Feeling down, depressed or hopeless 0   PHQ-2 Score 0   PHQ-2 Total Score (12-17 Years)- Positive if 3 or more points; Administer PHQ-A if positive -   Q1: Little interest or pleasure in doing things Not at all   Q2: Feeling down, depressed or hopeless Not at all   PHQ-2 Score 0       Have you ever done Advance Care Planning? (For example, a Health Directive, POLST, or a discussion with a medical provider or your loved ones about your wishes): No, advance care planning information given to patient to review.  Advanced care planning was discussed at today's visit.    Social History     Tobacco Use     Smoking status: Never     Smokeless tobacco: Never   Substance Use Topics     Alcohol use: No       Alcohol Use 11/30/2022   Prescreen: >3 drinks/day or >7 drinks/week? Not Applicable   Prescreen: >3 drinks/day or >7 drinks/week? -       Last PSA: No results found for: PSA    Reviewed orders with patient. Reviewed health maintenance and updated orders accordingly - Yes  Labs reviewed  "in EPIC    Reviewed and updated as needed this visit by clinical staff   Tobacco  Allergies  Meds  Problems  Med Hx  Surg Hx  Fam Hx          Reviewed and updated as needed this visit by Provider   Tobacco  Allergies  Meds  Problems  Med Hx  Surg Hx  Fam Hx         Past Medical History:   Diagnosis Date     Asthma 12/26/2012    childhood      Past Surgical History:   Procedure Laterality Date     COLONOSCOPY N/A 12/16/2020    Procedure: COLONOSCOPY;  Surgeon: Pepito Almeida MD;  Location:  GI       Review of Systems   Constitutional: Negative for chills and fever.   HENT: Negative for congestion, ear pain, hearing loss and sore throat.    Eyes: Negative for pain and visual disturbance.   Respiratory: Negative for cough and shortness of breath.    Cardiovascular: Negative for chest pain, palpitations and peripheral edema.   Gastrointestinal: Negative for abdominal pain, constipation, diarrhea, heartburn, hematochezia and nausea.   Genitourinary: Negative for dysuria, frequency, genital sores, hematuria, impotence, penile discharge and urgency.   Musculoskeletal: Negative for arthralgias, joint swelling and myalgias.   Skin: Negative for rash.   Neurological: Negative for dizziness, weakness, headaches and paresthesias.   Psychiatric/Behavioral: Negative for mood changes. The patient is not nervous/anxious.      OBJECTIVE:   /65 (BP Location: Right arm, Patient Position: Sitting, Cuff Size: Adult Regular)   Pulse 58   Temp 98  F (36.7  C) (Oral)   Resp 18   Ht 1.727 m (5' 8\")   Wt 77.6 kg (171 lb)   SpO2 99%   BMI 26.00 kg/m      Physical Exam  GENERAL: healthy, alert and no distress  EYES: Eyes grossly normal to inspection, PERRL and conjunctivae and sclerae normal  HENT: ear canals and TM's normal, nose and mouth without ulcers or lesions  NECK: no adenopathy, no asymmetry, masses, or scars and thyroid normal to palpation  RESP: lungs clear to auscultation - no rales, rhonchi or " wheezes  CV: regular rate and rhythm, normal S1 S2, no S3 or S4, no murmur, click or rub, no peripheral edema and peripheral pulses strong  ABDOMEN: soft, nontender, no hepatosplenomegaly, no masses and bowel sounds normal  MS: no gross musculoskeletal defects noted, no edema  SKIN: no suspicious lesions or rashes  NEURO: Normal strength and tone, mentation intact and speech normal  PSYCH: mentation appears normal, affect normal/bright    Diagnostic Test Results:  Labs reviewed in Epic    ASSESSMENT/PLAN:     Problem List Items Addressed This Visit        Other    Routine general medical examination at a health care facility - Primary     Max is doing well today.  We will update his screening/fasting blood work today.  Declined screening for HIV and hepatitis C.  Offered and discussed vaccination for influenza, COVID booster and shingles.  Patient was given the opportunity to ask questions or raise concerns regarding these recommended vaccine, he declined all vaccinations did not wish to discuss further.  We also discussed importance of diet and exercise, his BMI is very mildly elevated at 26.  He plans to work on these lifestyle changes to lose a few pounds.  Colonoscopy up-to-date, not due again until 2030.         Relevant Orders    Comprehensive metabolic panel    Lipid Profile (Chol, Trig, HDL, LDL calc)    Hemoglobin A1c   Other Visit Diagnoses     Screening for hyperlipidemia        Relevant Orders    Lipid Profile (Chol, Trig, HDL, LDL calc)    Screening for diabetes mellitus        Relevant Orders    Hemoglobin A1c    Vaccine counseling            Patient has been advised of split billing requirements and indicates understanding: Yes    COUNSELING:   Reviewed preventive health counseling, as reflected in patient instructions  Special attention given to:        Regular exercise       Healthy diet/nutrition       Immunizations    Declined: Covid-19, Influenza and Zoster       BMI:   Estimated body mass index  "is 26 kg/m  as calculated from the following:    Height as of this encounter: 1.727 m (5' 8\").    Weight as of this encounter: 77.6 kg (171 lb).   Weight management plan: Discussed healthy diet and exercise guidelines    He reports that he has never smoked. He has never used smokeless tobacco.    Kimberlyn Espinoza MD  [facility]  "

## 2022-12-01 NOTE — RESULT ENCOUNTER NOTE
Here are your recent results. Your A1c (screen for diabetes) was negative. Your kidney and liver function and electrolytes were normal. Your fasting blood sugar for your work paperwork was 99.     Your cholesterol was a bit higher this year than last year. However, we don't treat cholesterol just based on numbers, we decide to treat based on overall risk for heart attack and stroke, which having high cholesterol puts you at risk for. When I put your cholesterol numbers, age, gender and any known risk factors (whether you have diabetes, hypertension or area smoker) into a calculator which calculates your 10 year risk of heart attack or stroke, your risk is estimated to be 4.1%. We don't need to start treating cholesterol until that number is 5-10%. So, you are close to the level where we would need to talk about starting a medication but for now I recommend that you definitely work on diet and some weight loss along with increasing exercise as able. As you do these things, we would expect to see improvement in your cholesterol numbers. We will continue to check them once a year.     I recommend at least 30 minutes of moderate intensity aerobic activity 5 or more days per week. Regarding diet, I recommend cutting back on saturated and trans fats (also called hydrogenated) by selecting lean cuts of meat, low-fat dairy, and using oils instead of solid fats. Limit baked goods, processed meats, and fried foods. Try to eat about two, 3.5 ounce servings of non-fried fish such as salmon, herring, sardines or mackerel per week . Most fish contain omega-3 fatty acids which can help lower total blood cholesterol and triglyceride levels. Eating more whole grains and soluble fiber (such as oat bran) will also help lower your cholesterol levels.

## 2023-12-04 ASSESSMENT — ENCOUNTER SYMPTOMS
MYALGIAS: 0
HEMATOCHEZIA: 0
ABDOMINAL PAIN: 0
FREQUENCY: 0
HEADACHES: 0
SORE THROAT: 0
DIARRHEA: 0
WEAKNESS: 0
DIZZINESS: 0
JOINT SWELLING: 0
HEARTBURN: 0
COUGH: 0
SHORTNESS OF BREATH: 0
PALPITATIONS: 0
NERVOUS/ANXIOUS: 0
PARESTHESIAS: 0
NAUSEA: 0
CHILLS: 0
FEVER: 0
EYE PAIN: 0
ARTHRALGIAS: 0
HEMATURIA: 0
DYSURIA: 0
CONSTIPATION: 0

## 2023-12-05 ENCOUNTER — OFFICE VISIT (OUTPATIENT)
Dept: INTERNAL MEDICINE | Facility: CLINIC | Age: 53
End: 2023-12-05
Payer: COMMERCIAL

## 2023-12-05 VITALS
HEIGHT: 68 IN | OXYGEN SATURATION: 100 % | TEMPERATURE: 97.6 F | BODY MASS INDEX: 25.98 KG/M2 | WEIGHT: 171.4 LBS | HEART RATE: 55 BPM | SYSTOLIC BLOOD PRESSURE: 122 MMHG | DIASTOLIC BLOOD PRESSURE: 78 MMHG | RESPIRATION RATE: 20 BRPM

## 2023-12-05 DIAGNOSIS — Z00.00 ROUTINE GENERAL MEDICAL EXAMINATION AT A HEALTH CARE FACILITY: Primary | ICD-10-CM

## 2023-12-05 DIAGNOSIS — H61.23 BILATERAL IMPACTED CERUMEN: ICD-10-CM

## 2023-12-05 DIAGNOSIS — Z23 ENCOUNTER FOR VACCINATION: ICD-10-CM

## 2023-12-05 DIAGNOSIS — Z11.4 SCREENING FOR HIV (HUMAN IMMUNODEFICIENCY VIRUS): ICD-10-CM

## 2023-12-05 DIAGNOSIS — Z11.59 NEED FOR HEPATITIS C SCREENING TEST: ICD-10-CM

## 2023-12-05 LAB
ALBUMIN SERPL BCG-MCNC: 4.5 G/DL (ref 3.5–5.2)
ALP SERPL-CCNC: 58 U/L (ref 40–150)
ALT SERPL W P-5'-P-CCNC: 38 U/L (ref 0–70)
ANION GAP SERPL CALCULATED.3IONS-SCNC: 8 MMOL/L (ref 7–15)
AST SERPL W P-5'-P-CCNC: 49 U/L (ref 0–45)
BILIRUB SERPL-MCNC: 0.6 MG/DL
BUN SERPL-MCNC: 10.5 MG/DL (ref 6–20)
CALCIUM SERPL-MCNC: 9.2 MG/DL (ref 8.6–10)
CHLORIDE SERPL-SCNC: 105 MMOL/L (ref 98–107)
CHOLEST SERPL-MCNC: 202 MG/DL
CREAT SERPL-MCNC: 1.02 MG/DL (ref 0.67–1.17)
DEPRECATED HCO3 PLAS-SCNC: 29 MMOL/L (ref 22–29)
EGFRCR SERPLBLD CKD-EPI 2021: 88 ML/MIN/1.73M2
ERYTHROCYTE [DISTWIDTH] IN BLOOD BY AUTOMATED COUNT: 12.6 % (ref 10–15)
GLUCOSE SERPL-MCNC: 96 MG/DL (ref 70–99)
HBA1C MFR BLD: 5.1 % (ref 0–5.6)
HCT VFR BLD AUTO: 45.7 % (ref 40–53)
HCV AB SERPL QL IA: NONREACTIVE
HDLC SERPL-MCNC: 41 MG/DL
HGB BLD-MCNC: 16.3 G/DL (ref 13.3–17.7)
HIV 1+2 AB+HIV1 P24 AG SERPL QL IA: NONREACTIVE
LDLC SERPL CALC-MCNC: 132 MG/DL
MCH RBC QN AUTO: 32.9 PG (ref 26.5–33)
MCHC RBC AUTO-ENTMCNC: 35.7 G/DL (ref 31.5–36.5)
MCV RBC AUTO: 92 FL (ref 78–100)
NONHDLC SERPL-MCNC: 161 MG/DL
PLATELET # BLD AUTO: 221 10E3/UL (ref 150–450)
POTASSIUM SERPL-SCNC: 3.9 MMOL/L (ref 3.4–5.3)
PROT SERPL-MCNC: 7.2 G/DL (ref 6.4–8.3)
RBC # BLD AUTO: 4.96 10E6/UL (ref 4.4–5.9)
SODIUM SERPL-SCNC: 142 MMOL/L (ref 135–145)
TRIGL SERPL-MCNC: 145 MG/DL
WBC # BLD AUTO: 4.8 10E3/UL (ref 4–11)

## 2023-12-05 PROCEDURE — 36415 COLL VENOUS BLD VENIPUNCTURE: CPT | Performed by: INTERNAL MEDICINE

## 2023-12-05 PROCEDURE — 80053 COMPREHEN METABOLIC PANEL: CPT | Performed by: INTERNAL MEDICINE

## 2023-12-05 PROCEDURE — 99396 PREV VISIT EST AGE 40-64: CPT | Mod: 25 | Performed by: INTERNAL MEDICINE

## 2023-12-05 PROCEDURE — 90471 IMMUNIZATION ADMIN: CPT | Performed by: INTERNAL MEDICINE

## 2023-12-05 PROCEDURE — 80061 LIPID PANEL: CPT | Performed by: INTERNAL MEDICINE

## 2023-12-05 PROCEDURE — 90750 HZV VACC RECOMBINANT IM: CPT | Performed by: INTERNAL MEDICINE

## 2023-12-05 PROCEDURE — 85027 COMPLETE CBC AUTOMATED: CPT | Performed by: INTERNAL MEDICINE

## 2023-12-05 PROCEDURE — 87389 HIV-1 AG W/HIV-1&-2 AB AG IA: CPT | Performed by: INTERNAL MEDICINE

## 2023-12-05 PROCEDURE — 86803 HEPATITIS C AB TEST: CPT | Performed by: INTERNAL MEDICINE

## 2023-12-05 PROCEDURE — 83036 HEMOGLOBIN GLYCOSYLATED A1C: CPT | Performed by: INTERNAL MEDICINE

## 2023-12-05 PROCEDURE — 69209 REMOVE IMPACTED EAR WAX UNI: CPT | Mod: 50 | Performed by: INTERNAL MEDICINE

## 2023-12-05 ASSESSMENT — PAIN SCALES - GENERAL: PAINLEVEL: NO PAIN (0)

## 2023-12-05 ASSESSMENT — ENCOUNTER SYMPTOMS
NAUSEA: 0
COUGH: 0
HEMATURIA: 0
FREQUENCY: 0
DIZZINESS: 0
ABDOMINAL PAIN: 0
MYALGIAS: 0
PALPITATIONS: 0
FEVER: 0
DYSURIA: 0
JOINT SWELLING: 0
WEAKNESS: 0
EYE PAIN: 0
CHILLS: 0
HEADACHES: 0
SHORTNESS OF BREATH: 0
ARTHRALGIAS: 0
HEMATOCHEZIA: 0
CONSTIPATION: 0
HEARTBURN: 0
PARESTHESIAS: 0
SORE THROAT: 0
NERVOUS/ANXIOUS: 0
DIARRHEA: 0

## 2023-12-05 NOTE — ASSESSMENT & PLAN NOTE
We discussed healthy lifestyle, nutrition, cardiovascular risk reduction, self care, safety, sunscreen, and timing of cancer screening.  Health maintenance screening and immunizations reviewed with the patient.    - Doing well today  - Will update labs  - Shingles vaccine today, he will get next at local pharmacy  - Declines flu and COVID booster  - Colonoscopy due 2030   - Recommended increasing exercise  - Follow up yearly for the annual physical.

## 2023-12-05 NOTE — PROGRESS NOTES
SUBJECTIVE:   Dakota is a 53 year old, presenting for the following:  Physical, Blood Draw (Fasting), and Imm/Inj (Shingles)        12/5/2023     8:43 AM   Additional Questions   Roomed by PHIL Garcia   Accompanied by JAZZY     Healthy Habits:     Getting at least 3 servings of Calcium per day:  Yes    Bi-annual eye exam:  Yes    Dental care twice a year:  Yes    Sleep apnea or symptoms of sleep apnea:  None    Diet:  Regular (no restrictions)    Frequency of exercise:  2-3 days/week    Duration of exercise:  15-30 minutes    Taking medications regularly:  Yes    Medication side effects:  Not applicable    Additional concerns today:  No    Today's PHQ-2 Score:       12/4/2023     2:34 PM   PHQ-2 ( 1999 Pfizer)   Q1: Little interest or pleasure in doing things 0   Q2: Feeling down, depressed or hopeless 0   PHQ-2 Score 0   Q1: Little interest or pleasure in doing things Not at all   Q2: Feeling down, depressed or hopeless Not at all   PHQ-2 Score 0     Dakota presents for his annual physical today.  He is doing well with no acute complaints.  He notes that he could improve his exercise.  Recently moved, which did help increase movement temporarily.     Colonoscopy due 2030. No family history of prostate or other cancers. He is a non-smoker.       Social History     Tobacco Use    Smoking status: Never    Smokeless tobacco: Never   Substance Use Topics    Alcohol use: No         12/4/2023     2:34 PM   Alcohol Use   Prescreen: >3 drinks/day or >7 drinks/week? Not Applicable       Reviewed orders with patient. Reviewed health maintenance and updated orders accordingly - Yes  Lab work is in process  Labs reviewed in EPIC    Reviewed and updated as needed this visit by clinical staff   Tobacco  Allergies  Meds  Problems  Med Hx  Surg Hx  Fam Hx          Reviewed and updated as needed this visit by Provider   Tobacco  Allergies  Meds  Problems  Med Hx  Surg Hx  Fam Hx         Past Medical History:   Diagnosis  "Date    Asthma 12/26/2012    childhood      Past Surgical History:   Procedure Laterality Date    COLONOSCOPY N/A 12/16/2020    Procedure: COLONOSCOPY;  Surgeon: Pepito Almeida MD;  Location:  GI       Review of Systems   Constitutional:  Negative for chills and fever.   HENT:  Negative for congestion, ear pain, hearing loss and sore throat.    Eyes:  Negative for pain and visual disturbance.   Respiratory:  Negative for cough and shortness of breath.    Cardiovascular:  Negative for chest pain, palpitations and peripheral edema.   Gastrointestinal:  Negative for abdominal pain, constipation, diarrhea, heartburn, hematochezia and nausea.   Genitourinary:  Negative for dysuria, frequency, genital sores, hematuria, impotence, penile discharge and urgency.   Musculoskeletal:  Negative for arthralgias, joint swelling and myalgias.   Skin:  Negative for rash.   Neurological:  Negative for dizziness, weakness, headaches and paresthesias.   Psychiatric/Behavioral:  Negative for mood changes. The patient is not nervous/anxious.      OBJECTIVE:   /78 (BP Location: Right arm, Patient Position: Sitting, Cuff Size: Adult Large)   Pulse 55   Temp 97.6  F (36.4  C) (Oral)   Resp 20   Ht 1.727 m (5' 8\")   Wt 77.7 kg (171 lb 6.4 oz)   SpO2 100%   BMI 26.06 kg/m      Physical Exam  GENERAL: healthy, alert and no distress  EYES: Eyes grossly normal to inspection, PERRL and conjunctivae and sclerae normal  HENT: ear canals normal, b/l TMs impacted by cerumen - better with some removal but still could not see back to TM, nose and mouth without ulcers or lesions  NECK: no adenopathy, no asymmetry, masses, or scars and thyroid normal to palpation  RESP: lungs clear to auscultation - no rales, rhonchi or wheezes  CV: regular rate and rhythm, normal S1 S2, no S3 or S4, no murmur, click or rub, no peripheral edema and peripheral pulses strong  ABDOMEN: soft, nontender, no hepatosplenomegaly, no masses and bowel sounds " normal  MS: no gross musculoskeletal defects noted, no edema  SKIN: no suspicious lesions or rashes  NEURO: Normal strength and tone, mentation intact and speech normal  PSYCH: mentation appears normal, affect normal/bright    Diagnostic Test Results:  Labs reviewed in Epic  No results found for this or any previous visit (from the past 24 hour(s)).    ASSESSMENT/PLAN:     Problem List Items Addressed This Visit          Nervous and Auditory    Bilateral impacted cerumen     B/l ear canals significant for impacted cerumen. MA did lavage and I tried to remove as much as I could but still quite adhered to TM.   - Offered ENT referral, he declined  - He will try debrox drops.          Relevant Orders    REMOVE IMPACTED CERUMEN (Completed)       Other    Routine general medical examination at a health care facility - Primary     We discussed healthy lifestyle, nutrition, cardiovascular risk reduction, self care, safety, sunscreen, and timing of cancer screening.  Health maintenance screening and immunizations reviewed with the patient.    - Doing well today  - Will update labs  - Shingles vaccine today, he will get next at local pharmacy  - Declines flu and COVID booster  - Colonoscopy due 2030   - Recommended increasing exercise  - Follow up yearly for the annual physical.           Relevant Orders    Lipid panel reflex to direct LDL Fasting    Hemoglobin A1c    Comprehensive metabolic panel    CBC with platelets     Other Visit Diagnoses       Screening for HIV (human immunodeficiency virus)        Relevant Orders    HIV Antigen Antibody Combo    Need for hepatitis C screening test        Relevant Orders    Hepatitis C Screen Reflex to HCV RNA Quant and Genotype    Encounter for vaccination        Relevant Orders    ZOSTER RECOMBINANT ADJUVANTED (SHINGRIX) (Completed)            Patient has been advised of split billing requirements and indicates understanding: Yes      COUNSELING:   Reviewed preventive health  counseling, as reflected in patient instructions  Special attention given to:        Regular exercise       Healthy diet/nutrition       Immunizations      He reports that he has never smoked. He has never used smokeless tobacco.            Kimberlyn Espinoza MD  [facility]

## 2023-12-05 NOTE — ASSESSMENT & PLAN NOTE
B/l ear canals significant for impacted cerumen. MA did lavage and I tried to remove as much as I could but still quite adhered to TM.   - Offered ENT referral, he declined  - He will try debrox drops.

## 2023-12-05 NOTE — LETTER
December 5, 2023      Edgar Lu  [address] [facility] [place] [address]        To Whom It May Concern:    Edgar Lu  was seen on 12/5/23 for annual physical. We did usual blood work to check blood counts, electrolytes, kidney function, liver function, blood sugar and cholesterol.         Sincerely,        Kimberlyn Espinoza MD

## 2024-12-10 ENCOUNTER — OFFICE VISIT (OUTPATIENT)
Dept: INTERNAL MEDICINE | Facility: CLINIC | Age: 54
End: 2024-12-10
Payer: COMMERCIAL

## 2024-12-10 VITALS
OXYGEN SATURATION: 98 % | RESPIRATION RATE: 18 BRPM | SYSTOLIC BLOOD PRESSURE: 108 MMHG | HEIGHT: 68 IN | HEART RATE: 58 BPM | BODY MASS INDEX: 22.9 KG/M2 | DIASTOLIC BLOOD PRESSURE: 68 MMHG | WEIGHT: 151.1 LBS

## 2024-12-10 DIAGNOSIS — E78.5 HYPERLIPIDEMIA LDL GOAL <100: ICD-10-CM

## 2024-12-10 DIAGNOSIS — Z12.5 SCREENING FOR PROSTATE CANCER: ICD-10-CM

## 2024-12-10 DIAGNOSIS — Z00.00 ROUTINE GENERAL MEDICAL EXAMINATION AT A HEALTH CARE FACILITY: Primary | ICD-10-CM

## 2024-12-10 LAB
ALT SERPL W P-5'-P-CCNC: 33 U/L (ref 0–70)
ANION GAP SERPL CALCULATED.3IONS-SCNC: 9 MMOL/L (ref 7–15)
BUN SERPL-MCNC: 19.3 MG/DL (ref 6–20)
CALCIUM SERPL-MCNC: 9.6 MG/DL (ref 8.8–10.4)
CHLORIDE SERPL-SCNC: 104 MMOL/L (ref 98–107)
CHOLEST SERPL-MCNC: 189 MG/DL
CREAT SERPL-MCNC: 0.98 MG/DL (ref 0.67–1.17)
EGFRCR SERPLBLD CKD-EPI 2021: >90 ML/MIN/1.73M2
FASTING STATUS PATIENT QL REPORTED: YES
FASTING STATUS PATIENT QL REPORTED: YES
GLUCOSE SERPL-MCNC: 86 MG/DL (ref 70–99)
HCO3 SERPL-SCNC: 27 MMOL/L (ref 22–29)
HDLC SERPL-MCNC: 43 MG/DL
LDLC SERPL CALC-MCNC: 125 MG/DL
NONHDLC SERPL-MCNC: 146 MG/DL
POTASSIUM SERPL-SCNC: 4.3 MMOL/L (ref 3.4–5.3)
PSA SERPL DL<=0.01 NG/ML-MCNC: 0.67 NG/ML (ref 0–3.5)
SODIUM SERPL-SCNC: 140 MMOL/L (ref 135–145)
TRIGL SERPL-MCNC: 103 MG/DL

## 2024-12-10 PROCEDURE — 99396 PREV VISIT EST AGE 40-64: CPT | Performed by: INTERNAL MEDICINE

## 2024-12-10 PROCEDURE — 80061 LIPID PANEL: CPT | Performed by: INTERNAL MEDICINE

## 2024-12-10 PROCEDURE — 84460 ALANINE AMINO (ALT) (SGPT): CPT | Performed by: INTERNAL MEDICINE

## 2024-12-10 PROCEDURE — 80048 BASIC METABOLIC PNL TOTAL CA: CPT | Performed by: INTERNAL MEDICINE

## 2024-12-10 PROCEDURE — 36415 COLL VENOUS BLD VENIPUNCTURE: CPT | Performed by: INTERNAL MEDICINE

## 2024-12-10 PROCEDURE — G0103 PSA SCREENING: HCPCS | Performed by: INTERNAL MEDICINE

## 2024-12-10 SDOH — HEALTH STABILITY: PHYSICAL HEALTH: ON AVERAGE, HOW MANY MINUTES DO YOU ENGAGE IN EXERCISE AT THIS LEVEL?: 40 MIN

## 2024-12-10 SDOH — HEALTH STABILITY: PHYSICAL HEALTH: ON AVERAGE, HOW MANY DAYS PER WEEK DO YOU ENGAGE IN MODERATE TO STRENUOUS EXERCISE (LIKE A BRISK WALK)?: 2 DAYS

## 2024-12-10 ASSESSMENT — SOCIAL DETERMINANTS OF HEALTH (SDOH): HOW OFTEN DO YOU GET TOGETHER WITH FRIENDS OR RELATIVES?: THREE TIMES A WEEK

## 2024-12-10 NOTE — PROGRESS NOTES
Preventive Care Visit  [facility]  Dillon Phillips MD, Internal Medicine  Dec 10, 2024    Assessment & Plan   Routine general medical examination at a health care facility  Reviewed PMH. Discussed healthcare maintenance issues, including cancer screenings, relevant immunizations (encouraged patient obtain second Shingrix shot through a pharmacy in [place]), and cardiac risk factor screenings such as for cholesterol, HTN, and DM. Discussed role of diet in health and encouraged patient to eat more fresh foods + less processed foods. Discussed role of exercise in health and encourage patient to keeping moving their body regularly.    Hyperlipidemia LDL goal <100  Fasting lab check today.  - Basic metabolic panel; Future  - ALT; Future  - Lipid panel reflex to direct LDL Fasting; Future    Screening for prostate cancer  No past PSA on file. He would like to start screening. Order placed.  - Prostate Specific Antigen Screen; Future    Counseling  Appropriate preventive services were addressed with this patient via screening, questionnaire, or discussion as appropriate for fall prevention, nutrition, physical activity, Tobacco-use cessation, social engagement, weight loss and cognition. Checklist reviewing preventive services available has been given to the patient. Reviewed patient's diet, addressing concerns and/or questions. He is at risk for lack of exercise and has been provided with information to increase physical activity for the benefit of his well-being.     Signed Electronically by:  Dillon Phillips MD, MPH  [facility]  Internal Medicine    Subjective   Dakota is a 54 year old presenting for the following: Physical    HPI  Dakota presents today for a physical exam. He is fasting. He is interested in starting PSA screening.    Health Care Directive Patient does not have a Health Care Directive        12/10/2024   General Health   How would you rate your overall  physical health? Excellent   Feel stress (tense, anxious, or unable to sleep) Not at all          12/10/2024   Nutrition   Three or more servings of calcium each day? Yes   Diet: Carbohydrate counting   How many servings of fruit and vegetables per day? (!) 2-3   How many sweetened beverages each day? 0-1          12/10/2024   Exercise   Days per week of moderate/strenous exercise 2 days   Average minutes spent exercising at this level 40 min      (!) EXERCISE CONCERN      12/10/2024   Social Factors   Frequency of gathering with friends or relatives Three times a week   Worry food won't last until get money to buy more No   Food not last or not have enough money for food? No   Do you have housing? (Housing is defined as stable permanent housing and does not include staying ouside in a car, in a tent, in an abandoned building, in an overnight shelter, or couch-surfing.) Yes   Are you worried about losing your housing? No   Lack of transportation? No   Unable to get utilities (heat,electricity)? No          12/10/2024   Fall Risk   Fallen 2 or more times in the past year? No    Trouble with walking or balance? No        Patient-reported          12/10/2024   Dental   Dentist two times every year? Yes          12/10/2024   TB Screening   Were you born outside of the [place]? No      Today's PHQ-2 Score:       12/10/2024     1:37 PM   PHQ-2 ( 1999 [employer])   Q1: Little interest or pleasure in doing things 0    Q2: Feeling down, depressed or hopeless 0    PHQ-2 Score 0    Q1: Little interest or pleasure in doing things Not at all   Q2: Feeling down, depressed or hopeless Not at all   PHQ-2 Score 0       Patient-reported         12/10/2024   Substance Use   Alcohol more than 3/day or more than 7/wk Not Applicable   Do you use any other substances recreationally? No      Social History     Tobacco Use    Smoking status: Never    Smokeless tobacco: Never   Vaping Use    Vaping status: Never Used   Substance Use Topics    Alcohol  "use: No    Drug use: Never         12/10/2024   STI Screening   New sexual partner(s) since last STI/HIV test? No      ASCVD Risk   The 10-year ASCVD risk score (Sarah NEELY, et al., 2019) is: 4.6%    Values used to calculate the score:      Age: 54 years      Sex: Male      Is Non- : No      Diabetic: No      Tobacco smoker: No      Systolic Blood Pressure: 108 mmHg      Is BP treated: No      HDL Cholesterol: 41 mg/dL      Total Cholesterol: 202 mg/dL    Reviewed and updated as needed this visit by Provider   Tobacco  Allergies  Meds  Problems  Med Hx  Surg Hx  Fam Hx             Objective    Exam  /68   Pulse 58   Resp 18   Ht 1.727 m (5' 8\")   Wt 68.5 kg (151 lb 1.6 oz)   SpO2 98%   BMI 22.97 kg/m     Estimated body mass index is 22.97 kg/m  as calculated from the following:    Height as of this encounter: 1.727 m (5' 8\").    Weight as of this encounter: 68.5 kg (151 lb 1.6 oz).    Physical Exam  GENERAL: In no distress.  EYES: Conjunctivae/corneas clear. EOMs grossly intact.  HENT: NC/AT, facies symmetric. Neck supple. No LAD or thyromegaly noted.  RESP: CTAB. No w/r/r.  CV: RRR, no m/r/g.  GI: NT, ND, without rebound or guarding, no CVA tenderness, no hepatomegaly appreciated.  MSK: Moves all four extremities freely.  SKIN: No significant ulcers, lesions or rashes on the visualized portions of the skin  NEURO: Alert. Oriented.  PSYCH: Linear thought process. Speech normal rate and volume. No tangential thoughts, hallucinations, or delusions.  "

## 2024-12-10 NOTE — PATIENT INSTRUCTIONS
- I will send you a message on [employer] when I am able to look at the results of your tests from today    - Make an appointment with our pharmacy downstairs or stop by your preferred pharmacy to discuss obtaining the second/final dose of the Shingrix (shingles) vaccine series

## (undated) RX ORDER — FENTANYL CITRATE 50 UG/ML
INJECTION, SOLUTION INTRAMUSCULAR; INTRAVENOUS
Status: DISPENSED
Start: 2020-12-16